# Patient Record
Sex: MALE | Race: WHITE | NOT HISPANIC OR LATINO
[De-identification: names, ages, dates, MRNs, and addresses within clinical notes are randomized per-mention and may not be internally consistent; named-entity substitution may affect disease eponyms.]

---
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
## 2024-03-12 ENCOUNTER — APPOINTMENT (OUTPATIENT)
Dept: PULMONOLOGY | Facility: CLINIC | Age: 19
End: 2024-03-12
Payer: COMMERCIAL

## 2024-03-12 VITALS
WEIGHT: 180 LBS | HEIGHT: 72 IN | DIASTOLIC BLOOD PRESSURE: 72 MMHG | BODY MASS INDEX: 24.38 KG/M2 | HEART RATE: 66 BPM | SYSTOLIC BLOOD PRESSURE: 120 MMHG | OXYGEN SATURATION: 97 %

## 2024-03-12 DIAGNOSIS — R05.3 CHRONIC COUGH: ICD-10-CM

## 2024-03-12 PROCEDURE — 99204 OFFICE O/P NEW MOD 45 MIN: CPT

## 2024-03-12 NOTE — ASSESSMENT
[FreeTextEntry1] : 17 yo chronic marijuana smoker M w/ no significant pmhx other than anxiety/ depression (on zoloft) presenting for occasional but persistent coughs productive of clear mucus.  > Chronic cough  - Physical exam is benign and CXR done in office (personally reviewed) with no obvious infiltrates. Possible slight puckering of the left hemidiaphragm and some accentuation of vasculature towards the bases. No obvious mediastinal abnormality. - Most likely marijuana smoke exposure related bronchitis but will do PFT/FeNO to see if there is underlying asthma - Encouraged him to stop smoking marijuana as long as possible and see if this improves symptoms - unlikely nasal congestion but reflux leading to cough is also possible. If PFT/FeNO wnl and symptoms prolong after marijuana stopped, may trial PPI in the future. - Patient will be going back to college next week. Will call for PFT results and follow up in May when he comes back. Plan for repeat CXR to ensure subtle abnormalities above are stable or resolved.

## 2024-03-12 NOTE — HISTORY OF PRESENT ILLNESS
[TextBox_4] : 19 yo M w/ no significant pmhx other than anxiety/ depression (on zoloft) presenting for occasional but persistent coughs productive of clear mucus. For the past 1.5-2y, patient has been using marijuana daily at night (smoking with paper) and has been having symptoms since then. He has no overt wheezing or dyspnea but believes that after running he has slight wheezes along with the cough.  He more recently had a 5-day trip to [place] and did not use marijuana then, but still had occasional coughs with clear phlegm production. Other than symptoms mentioned above, he has no other symptoms including dyspnea (both at rest and with exertion), chest pain, etc. Also has no constitutional symptoms including fevers, night sweats, unintentional weight loss.  No family history of pulmonary diseases, no personal history of any pulmonary issues including childhood asthma No nasal congestion, possibly some acid reflux type symptoms  No obvious exposure history  [ESS] : 0

## 2024-04-12 ENCOUNTER — RX RENEWAL (OUTPATIENT)
Age: 19
End: 2024-04-12

## 2024-04-12 RX ORDER — ALBUTEROL SULFATE 90 UG/1
108 (90 BASE) INHALANT RESPIRATORY (INHALATION)
Qty: 1 | Refills: 5 | Status: ACTIVE | COMMUNITY
Start: 2024-04-12 | End: 1900-01-01

## 2024-04-12 RX ORDER — PANTOPRAZOLE 40 MG/1
40 TABLET, DELAYED RELEASE ORAL DAILY
Qty: 90 | Refills: 0 | Status: ACTIVE | COMMUNITY
Start: 2024-04-12 | End: 1900-01-01

## 2024-07-22 ENCOUNTER — APPOINTMENT (OUTPATIENT)
Dept: PULMONOLOGY | Facility: CLINIC | Age: 19
End: 2024-07-22
Payer: COMMERCIAL

## 2024-07-22 VITALS
SYSTOLIC BLOOD PRESSURE: 120 MMHG | WEIGHT: 180 LBS | OXYGEN SATURATION: 98 % | BODY MASS INDEX: 24.38 KG/M2 | HEIGHT: 72 IN | DIASTOLIC BLOOD PRESSURE: 71 MMHG | HEART RATE: 48 BPM

## 2024-07-22 DIAGNOSIS — R05.3 CHRONIC COUGH: ICD-10-CM

## 2024-07-22 DIAGNOSIS — R07.9 CHEST PAIN, UNSPECIFIED: ICD-10-CM

## 2024-07-22 PROCEDURE — 99214 OFFICE O/P EST MOD 30 MIN: CPT

## 2024-07-22 NOTE — HISTORY OF PRESENT ILLNESS
[TextBox_4] : 17 yo M w/ no significant pmhx other than anxiety/ depression (on zoloft) presenting for occasional but persistent coughs productive of clear mucus. For the past 1.5-2y, patient has been using marijuana daily at night (smoking with paper) and has been having symptoms since then. He has no overt wheezing or dyspnea but believes that after running he has slight wheezes along with the cough.  He more recently had a 5-day trip to [place] and did not use marijuana then, but still had occasional coughs with clear phlegm production. Other than symptoms mentioned above, he has no other symptoms including dyspnea (both at rest and with exertion), chest pain, etc. Also has no constitutional symptoms including fevers, night sweats, unintentional weight loss.  No family history of pulmonary diseases, no personal history of any pulmonary issues including childhood asthma No nasal congestion, possibly some acid reflux type symptoms  No obvious exposure history   7/22/24 Patient presents for follow-up.  Has decreased his marijuana use and respiratory symptoms overall seem to be improving afterwards.  Still gets occasional chest pain with deep inspiration but is unclear whether it is due to anxiety or physiological. [ESS] : 0

## 2024-07-22 NOTE — ASSESSMENT
[FreeTextEntry1] : 17 yo chronic marijuana smoker M w/ no significant pmhx other than anxiety/ depression (on zoloft) f/u for occasional but persistent coughs productive of clear mucus.  > Chronic cough > Chest pain  -PFT from 3/14/2024 showed only mild restrictive ventilatory based on a TLC Z-score of -2.18 (80% of predicted) with preserved vital capacity at 97% predicted.  There is no significant obstruction and no significant bronchodilator response.  FeNO was also within normal limits as well as DLCO. - Dyspnea and cough may have been related to marijuana exposure given above results with improvement of his symptoms with decrease marijuana use. - Albuterol seems to be helping his symptoms although there is no evidence of asthma based on PFT results above.  Will check with methacholine challenge test. Albuterol refilled today - Chest x-ray that was done in office last visit showed no obvious infiltrates and was normal for the most part but there is possible slight elevation of the left hemidiaphragm.  If methacholine challenge test is inconclusive and he still has protracted symptoms, may consider CT scan. - He occasionally has pleuritic chest pain.  Although there is no lower extremity edema and he has no risk factors, we will draw D-dimer to ensure there is no thromboembolic disease.  Low suspicion though  f/u via phone for above mentioned test results and further work up.

## 2024-07-23 LAB — DEPRECATED D DIMER PPP IA-ACNC: <150 NG/ML DDU

## 2025-02-27 ENCOUNTER — RX RENEWAL (OUTPATIENT)
Age: 20
End: 2025-02-27